# Patient Record
Sex: MALE | Race: WHITE
[De-identification: names, ages, dates, MRNs, and addresses within clinical notes are randomized per-mention and may not be internally consistent; named-entity substitution may affect disease eponyms.]

---

## 2019-03-15 ENCOUNTER — HOSPITAL ENCOUNTER (EMERGENCY)
Dept: HOSPITAL 62 - ER | Age: 54
Discharge: HOME | End: 2019-03-15
Payer: COMMERCIAL

## 2019-03-15 VITALS — SYSTOLIC BLOOD PRESSURE: 146 MMHG | DIASTOLIC BLOOD PRESSURE: 91 MMHG

## 2019-03-15 DIAGNOSIS — M25.561: Primary | ICD-10-CM
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
## 2019-03-15 PROCEDURE — 99283 EMERGENCY DEPT VISIT LOW MDM: CPT

## 2019-03-15 NOTE — ER DOCUMENT REPORT
HPI





- HPI


Pain Level: 3


Notes: 





Patient is a 53-year-old male who presents emergency department complaining of 

right medial knee pain status post twist injury earlier this afternoon.  Patient

states that his foot got caught in the mud and he fell forward and his knee 

twisted.  Patient states that he has had medial knee pain since then, but has 

been able to ambulate with a slight limp.  Patient has not noticed any bruising 

or deformity/swelling.  He is eating and drinking without difficulties.  He is 

urinating normally and having normal bowel movements.  No other concerns or 

complaints.  Denies any headache, fever, head injury, neck pain, URI, sore 

throat, chest pain, palpitations, syncope, cough, shortness of breath, wheeze, 

dyspnea, abdominal pain, nausea/vomiting/diarrhea, urinary retention, dysuria, 

hematuria, loss of control of bowel or bladder, numbness/tingling, muscle 

paralysis/weakness, or rash.





- ROS


Systems Reviewed and Negative: Yes All other systems reviewed and negative





- CONSTITUTIONAL


Constitutional: DENIES: Fever, Chills





- MUSCULOSKELETAL


Musculoskeletal: REPORTS: Extremity pain - R knee





Past Medical History





- Social History


Smoking Status: Never Smoker


Family History: Reviewed & Not Pertinent


Patient has suicidal ideation: No


Patient has homicidal ideation: No


Renal/ Medical History: Denies: Hx Peritoneal Dialysis





Vertical Provider Document





- CONSTITUTIONAL


Agree With Documented VS: Yes


Notes: 





PHYSICAL EXAMINATION:





GENERAL: Well-appearing, well-nourished and in no acute distress.





LUNGS: Breath sounds clear to auscultation bilaterally and equal.  No wheezes 

rales or rhonchi.





HEART: Regular rate and rhythm without murmurs, rubs, gallops.





Musculoskeletal: Rt knee:  No obvious swelling, ecchymosis, effusion, or 

deformity.  FROM to passive/active and flexion >90 w/o difficulty.  + medial 

tenderness noted. Strength 5+/5. N/V intact distal.  Ligamentous grossly stable,

limited exam with larger leg size.  Corine grossly negative.  Patellar grind 

negative.  No calf tenderness.





Extremities:  No cyanosis, clubbing, or edema b/l.  Peripheral pulses 2+.  

Capillary refill less than 3 seconds.  Juantio neg b/l.





NEUROLOGICAL: Normal speech, normal gait.  Normal sensory, motor exams 





PSYCH: Normal mood, normal affect.





SKIN: Warm, Dry, normal turgor, no rashes or lesions noted.





- INFECTION CONTROL


TRAVEL OUTSIDE OF THE [place] IN LAST 30 DAYS: No





Course





- Re-evaluation


Re-evalutation: 





03/15/19


Pt declines crutches/imobilizer/pain medicine.





Patient is an afebrile, well-hydrated, 53-year-old male who presents to the ED 

with Rt medial knee pain which I suspect to be a sprain versus strain.  Vitals 

are acceptable without any significant tachycardia, tachypnea, or hypoxia.  PE 

is otherwise unremarkable for any neurovascular compromise, obvious 

tendon/ligament rupture, obvious fracture/dislocation, septic joint.  X-ray was 

unremarkable for any acute pathology.  Patient is nontoxic-appearing.  Patient 

is able to ambulate and weight-bear although he is mildly limping.  No other 

labs or imaging warranted at this time based on H&P.  Conservative measures 

otherwise for symptoms.  Recheck with your PCM in 3-5 days.  Consider consult 

orthopedics.  Return to the ED with any worsening/concerning symptoms otherwise 

as reviewed in discharge.  Patient is in agreement.





- Vital Signs


Vital signs: 


                                        











Temp Pulse Resp BP Pulse Ox


 


 98.5 F   81   17   146/91 H  96 


 


 03/15/19 19:42  03/15/19 19:42  03/15/19 19:42  03/15/19 19:42  03/15/19 19:42














Discharge





- Discharge


Clinical Impression: 


Right knee pain


Qualifiers:


 Chronicity: acute Qualified Code(s): M25.561 - Pain in right knee





Condition: Stable


Disposition: HOME, SELF-CARE


Additional Instructions: 


Rest, Ice, Compression, Elevation


Tylenol/ibuprofen as needed


Light stretches daily


Strength exercises as able


Moist heat and massage may help


F/u with your PCP in 3-5 days for a recheck


Consider consult(s) with Orthopedics/physical therapy for ongoing/worsening 

symptoms





Return to the ED with any worsening symptoms and/or development of fever, 

headache, chest pain, palpitations, syncope, shortness of breath, trouble 

breathing, abdominal pain, n/v/d, muscle weakness/paralysis, numbness/tingling, 

swelling, redness, or other worsening symptoms that are concerning to you.


Forms:  Elevated Blood Pressure


Referrals: 


[facility] [Provider Group] - Follow up as needed

## 2019-03-15 NOTE — RADIOLOGY REPORT (SQ)
EXAM DESCRIPTION: 



XR KNEE 4 OR MORE VIEWS



COMPLETED DATE/TME:  03/15/2019 20:07



CLINICAL HISTORY: 



53 years, Male, pain and injury



COMPARISON:

None.



NUMBER OF VIEWS:

Four



TECHNIQUE:

Four views of the RIGHT knee were obtained in AP, bilateral

oblique and lateral projection.



LIMITATIONS:

None.



FINDINGS:



No fracture or dislocation. The soft tissues are within normal

limits. The joint spaces are preserved. Incidentally noted

superolateral lucency with sclerosis traversing the patella, a

finding which can be seen as a congenital variant. Popliteal

fossa calcification compatible with a fabella.



IMPRESSION:



No acute radiographic abnormality.

 



copyright 2011 [employer]- All Rights Reserved